# Patient Record
Sex: FEMALE | Race: WHITE | NOT HISPANIC OR LATINO | Employment: FULL TIME | URBAN - METROPOLITAN AREA
[De-identification: names, ages, dates, MRNs, and addresses within clinical notes are randomized per-mention and may not be internally consistent; named-entity substitution may affect disease eponyms.]

---

## 2021-05-29 ENCOUNTER — RECORDS - HEALTHEAST (OUTPATIENT)
Dept: ADMINISTRATIVE | Facility: CLINIC | Age: 36
End: 2021-05-29

## 2024-01-05 ENCOUNTER — TELEPHONE (OUTPATIENT)
Dept: BEHAVIORAL HEALTH | Facility: CLINIC | Age: 39
End: 2024-01-05
Payer: COMMERCIAL

## 2024-01-05 ENCOUNTER — HOSPITAL ENCOUNTER (OUTPATIENT)
Facility: CLINIC | Age: 39
Setting detail: OBSERVATION
Discharge: HOME | End: 2024-01-06
Admitting: NURSE PRACTITIONER
Payer: COMMERCIAL

## 2024-01-05 VITALS
BODY MASS INDEX: 29.79 KG/M2 | HEART RATE: 72 BPM | HEIGHT: 67 IN | DIASTOLIC BLOOD PRESSURE: 80 MMHG | WEIGHT: 189.8 LBS | RESPIRATION RATE: 18 BRPM | SYSTOLIC BLOOD PRESSURE: 117 MMHG | TEMPERATURE: 97.7 F | OXYGEN SATURATION: 98 %

## 2024-01-05 DIAGNOSIS — F32.A ANXIETY AND DEPRESSION: ICD-10-CM

## 2024-01-05 DIAGNOSIS — F41.9 ANXIETY AND DEPRESSION: ICD-10-CM

## 2024-01-05 DIAGNOSIS — F33.1 MODERATE EPISODE OF RECURRENT MAJOR DEPRESSIVE DISORDER (H): ICD-10-CM

## 2024-01-05 DIAGNOSIS — R45.851 SUICIDAL IDEATION: ICD-10-CM

## 2024-01-05 PROBLEM — F32.9 MAJOR DEPRESSIVE DISORDER WITH CURRENT ACTIVE EPISODE: Status: ACTIVE | Noted: 2024-01-05

## 2024-01-05 PROCEDURE — 250N000013 HC RX MED GY IP 250 OP 250 PS 637: Performed by: NURSE PRACTITIONER

## 2024-01-05 PROCEDURE — G0378 HOSPITAL OBSERVATION PER HR: HCPCS

## 2024-01-05 PROCEDURE — 99285 EMERGENCY DEPT VISIT HI MDM: CPT

## 2024-01-05 PROCEDURE — 99223 1ST HOSP IP/OBS HIGH 75: CPT | Performed by: NURSE PRACTITIONER

## 2024-01-05 RX ORDER — GABAPENTIN 100 MG/1
100-200 CAPSULE ORAL 3 TIMES DAILY PRN
Status: DISCONTINUED | OUTPATIENT
Start: 2024-01-05 | End: 2024-01-06 | Stop reason: HOSPADM

## 2024-01-05 RX ORDER — ACETAMINOPHEN 325 MG/1
650 TABLET ORAL EVERY 4 HOURS PRN
Status: DISCONTINUED | OUTPATIENT
Start: 2024-01-05 | End: 2024-01-06 | Stop reason: HOSPADM

## 2024-01-05 RX ORDER — ESCITALOPRAM OXALATE 5 MG/1
5 TABLET ORAL DAILY
Status: DISCONTINUED | OUTPATIENT
Start: 2024-01-05 | End: 2024-01-06 | Stop reason: HOSPADM

## 2024-01-05 RX ORDER — PROPRANOLOL HYDROCHLORIDE 10 MG/1
5 TABLET ORAL
COMMUNITY
Start: 2023-10-26

## 2024-01-05 RX ORDER — PSEUDOEPHEDRINE HCL 30 MG
30 TABLET ORAL
COMMUNITY

## 2024-01-05 RX ORDER — BUPROPION HYDROCHLORIDE 300 MG/1
300 TABLET ORAL DAILY
Status: DISCONTINUED | OUTPATIENT
Start: 2024-01-06 | End: 2024-01-06 | Stop reason: HOSPADM

## 2024-01-05 RX ORDER — ONDANSETRON 4 MG/1
4 TABLET, ORALLY DISINTEGRATING ORAL EVERY 6 HOURS PRN
Status: DISCONTINUED | OUTPATIENT
Start: 2024-01-05 | End: 2024-01-06 | Stop reason: HOSPADM

## 2024-01-05 RX ORDER — TRAZODONE HYDROCHLORIDE 50 MG/1
50 TABLET, FILM COATED ORAL AT BEDTIME
Status: DISCONTINUED | OUTPATIENT
Start: 2024-01-05 | End: 2024-01-06 | Stop reason: HOSPADM

## 2024-01-05 RX ORDER — OLANZAPINE 10 MG/2ML
10 INJECTION, POWDER, FOR SOLUTION INTRAMUSCULAR 2 TIMES DAILY PRN
Status: DISCONTINUED | OUTPATIENT
Start: 2024-01-05 | End: 2024-01-06 | Stop reason: HOSPADM

## 2024-01-05 RX ORDER — OLANZAPINE 5 MG/1
5-10 TABLET, ORALLY DISINTEGRATING ORAL 2 TIMES DAILY PRN
Status: DISCONTINUED | OUTPATIENT
Start: 2024-01-05 | End: 2024-01-06 | Stop reason: HOSPADM

## 2024-01-05 RX ORDER — IBUPROFEN 600 MG/1
600 TABLET, FILM COATED ORAL EVERY 6 HOURS PRN
Status: DISCONTINUED | OUTPATIENT
Start: 2024-01-05 | End: 2024-01-06 | Stop reason: HOSPADM

## 2024-01-05 RX ORDER — IBUPROFEN 400 MG/1
400 TABLET, FILM COATED ORAL
COMMUNITY

## 2024-01-05 RX ORDER — PROPRANOLOL HCL 10 MG
5 TABLET ORAL 2 TIMES DAILY PRN
Status: DISCONTINUED | OUTPATIENT
Start: 2024-01-05 | End: 2024-01-06 | Stop reason: HOSPADM

## 2024-01-05 RX ORDER — BUPROPION HYDROCHLORIDE 300 MG/1
300 TABLET ORAL
COMMUNITY
Start: 2022-11-03

## 2024-01-05 RX ADMIN — TRAZODONE HYDROCHLORIDE 25 MG: 50 TABLET ORAL at 22:32

## 2024-01-05 RX ADMIN — GABAPENTIN 200 MG: 100 CAPSULE ORAL at 21:40

## 2024-01-05 RX ADMIN — ESCITALOPRAM 5 MG: 5 TABLET, FILM COATED ORAL at 19:27

## 2024-01-05 ASSESSMENT — ACTIVITIES OF DAILY LIVING (ADL)
ADLS_ACUITY_SCORE: 35

## 2024-01-05 NOTE — ED TRIAGE NOTES
Increasing suicidal thoughts and plan to sit in a running car to harm herself. History of SI for last 4 years.      Triage Assessment (Adult)       Row Name 01/05/24 1322          Triage Assessment    Airway WDL WDL        Respiratory WDL    Respiratory WDL WDL        Skin Circulation/Temperature WDL    Skin Circulation/Temperature WDL WDL        Cardiac WDL    Cardiac WDL WDL        Peripheral/Neurovascular WDL    Peripheral Neurovascular WDL WDL        Cognitive/Neuro/Behavioral WDL    Cognitive/Neuro/Behavioral WDL X

## 2024-01-05 NOTE — PROGRESS NOTES
Pt is 38 year old female with history of Anxiety, Depression and SI received from ED due to worsening anxiety and suicide ideation. Reports that some of her stressors have been from work as a  and an end to a relationship after 10 years. She endorses SI with thoughts of sitting in the garage with her car on or that she wished someone can hit her with a car so she can die, and denies HI. Pt however agrees to remain safe in the unit. Pt came with Mom and her sister who are her support system. Nursing and risk assessments completed. Assessments reviewed with LMHP and physician. Admission information reviewed with patient. Patient given a tour of [facility] and instructions on using the facility. Questions regarding [facility] addressed. Pt safety search completed.

## 2024-01-05 NOTE — Clinical Note
Joana Kulwinder was seen and treated in our emergency department on 1/5/2024.    She was instructed to return to work when cleared by her outpatient provider.     Sincerely,     [facility] Emergency Dept

## 2024-01-05 NOTE — ED NOTES
[facility]  ED to EMPATH Checklist:      Goal for EMPATH: Depression management and Suicidality    Current Behavior: Cooperative    Safety Concerns: Suicidal, with a plan to    Legal Hold Status: Voluntary    Medically Cleared by ED provider: Yes    Patient Therapeutically Searched: Not searched - Currently in triage    Belongings: Remain with patient    Independent Ambulation at Baseline: Yes/No: Yes    Participates in Care/Conversation: Yes/No: Yes    Patient Informed about EMPATH: Yes/No: Yes    DEC: Not ordered    Patient Ready to be Transferred to EMPATH? Yes/No: Yes

## 2024-01-05 NOTE — ED PROVIDER NOTES
History     Chief Complaint:  Psychiatric Evaluation       HPI   Joana Miramontes is a 38 year old female with a history of anxiety and depression presents emergency department with a complaint of suicidal ideation.  Patient reports that she has been having suicidal ideation for 1 week.  Her thoughts are to sit in her car in the garage with it running, she is also hoping that she gets hit by a car so she can die.  Patient denies any thoughts of wanting to harm anyone else.  She has not ever been admitted before for a mental health problem.  She is currently on Wellbutrin and propranolol.  She has not made any attempts at suicide today.  She denies any self harming.      Independent Historian:   None - Patient Only    Review of External Notes:          Medications:    Wellbutrin  Propranolol    Past Medical History:    No past medical history on file.    Past Surgical History:    No past surgical history on file.     Physical Exam   Patient Vitals for the past 24 hrs:   BP Temp Temp src Pulse Resp SpO2   01/05/24 1321 (!) 145/81 97.3  F (36.3  C) Oral 78 18 99 %        Physical Exam  General: Tearful  Abdomen: Soft, non-tender  Respiratory: No tachypnea  Cardiovascular: Equal pulses, brisk cap refill  Extremities: Moving all extremities      Emergency Department Course       Imaging:  No orders to display          Laboratory:  Labs Ordered and Resulted from Time of ED Arrival to Time of ED Departure - No data to display     Procedures       Emergency Department Course & Assessments:    PSS-3      Date and Time Over the past 2 weeks have you felt down, depressed, or hopeless? Over the past 2 weeks have you had thoughts of killing yourself? Have you ever attempted to kill yourself? When did this last happen? User   01/05/24 1323 yes yes no -- XYQ                Suicide assessment completed by mental health (D.E.C., LCSW, etc.)    Interventions:  Medications - No data to display     Assessments:      Independent  Interpretation (X-rays, CTs, rhythm strip):  None    Consultations/Discussion of Management or Tests:  None        Social Determinants of Health affecting care:   None    Disposition:  The patient was transferred to [facility].     Impression & Plan    CMS Diagnoses: None      Medical Decision Makin-year-old female presents emergency department with a complaint of suicidal ideation.  Patient does have a history of anxiety and depression.  She is currently on Wellbutrin and propranolol.  Patient reports for the past week she has had suicidal ideation.  She has been thinking of plans.  She states that she hopes she gets in a car accident.  She also has been thinking about sitting in her car in the garage and letting it run.  Patient has never had any previous admissions for mental health.  On exam patient is tearful, but calm and cooperative.  She does not have any other complaints today.  [facility]      Diagnosis:    ICD-10-CM    1. Suicidal ideation  R45.851       2. Anxiety and depression  F41.9     F32.A            Discharge Medications:  New Prescriptions    No medications on file          Joana Mcintosh MD  2024   No att. providers found        Joana Mcintosh MD  24 0023

## 2024-01-06 PROCEDURE — G0378 HOSPITAL OBSERVATION PER HR: HCPCS

## 2024-01-06 PROCEDURE — 99239 HOSP IP/OBS DSCHRG MGMT >30: CPT | Performed by: NURSE PRACTITIONER

## 2024-01-06 PROCEDURE — 250N000013 HC RX MED GY IP 250 OP 250 PS 637: Performed by: NURSE PRACTITIONER

## 2024-01-06 RX ORDER — TRAZODONE HYDROCHLORIDE 50 MG/1
25-50 TABLET, FILM COATED ORAL
Qty: 30 TABLET | Refills: 0 | Status: SHIPPED | OUTPATIENT
Start: 2024-01-06

## 2024-01-06 RX ORDER — GABAPENTIN 100 MG/1
100-200 CAPSULE ORAL 3 TIMES DAILY PRN
Qty: 30 CAPSULE | Refills: 0 | Status: SHIPPED | OUTPATIENT
Start: 2024-01-06

## 2024-01-06 RX ORDER — ESCITALOPRAM OXALATE 10 MG/1
5 TABLET ORAL DAILY
Qty: 30 TABLET | Refills: 0 | Status: SHIPPED | OUTPATIENT
Start: 2024-01-06

## 2024-01-06 RX ADMIN — GABAPENTIN 100 MG: 100 CAPSULE ORAL at 11:48

## 2024-01-06 RX ADMIN — ESCITALOPRAM 5 MG: 5 TABLET, FILM COATED ORAL at 08:12

## 2024-01-06 RX ADMIN — BUPROPION HYDROCHLORIDE 300 MG: 300 TABLET, EXTENDED RELEASE ORAL at 08:12

## 2024-01-06 ASSESSMENT — ACTIVITIES OF DAILY LIVING (ADL)
ADLS_ACUITY_SCORE: 35

## 2024-01-06 ASSESSMENT — COLUMBIA-SUICIDE SEVERITY RATING SCALE - C-SSRS
2. HAVE YOU ACTUALLY HAD ANY THOUGHTS OF KILLING YOURSELF?: NO
ATTEMPT SINCE LAST CONTACT: NO
6. HAVE YOU EVER DONE ANYTHING, STARTED TO DO ANYTHING, OR PREPARED TO DO ANYTHING TO END YOUR LIFE?: NO
1. SINCE LAST CONTACT, HAVE YOU WISHED YOU WERE DEAD OR WISHED YOU COULD GO TO SLEEP AND NOT WAKE UP?: NO
TOTAL  NUMBER OF INTERRUPTED ATTEMPTS SINCE LAST CONTACT: NO
SUICIDE, SINCE LAST CONTACT: NO

## 2024-01-06 NOTE — PLAN OF CARE
Joana Moralesmaria e  January 5, 2024  Plan of Care Hand-off Note     Patient Care Path: observation    Plan for Care:   Pt is presenting with elevated symptoms of anxiety and SI due to increasing psychosocial stressors related to her work. She has been nauseated, dry heaving, pacing, sobbing and hyper ventilating. She has is having passive SI with thoughts and carbon monoxide posing or getting hit by a car. She denies ant past higher level of care, she denies past sucide attamepts and current  and VH.   A lower level of care has been unsuccessful in treating and stabilizing patient s mental health symptoms. Patient will remain on [facility] unit under observation for continued monitoring, treatment and therapeutic intervention of mental health symptoms. Observation at [facility] could help mitigate the need for a more restrictive level of care in an inpatient setting.    Identified Goals and Safety Issues: Decrease anxiety and SI.    Overview[facility]  [facility] [phone] [facility] [phone]    Discharge plan:  OP medication management appointment has been made for 1/12/22 at 2 PM and is listed in pt discharge instructions.  Pt may be interested in IOP or PHP programming and will need either program intake date or referrals to IOP programs.           Legal Status: Legal Status at Admission: Voluntary/Patient has signed consent for treatment    Psychiatry Consult: Pt is on [facility].        Updated APRN and RN  regarding plan of care.           Damaris Merchant

## 2024-01-06 NOTE — ED PROVIDER NOTES
"[facility] Unit - Initial Psychiatric Observation Note  [facility] Emergency Department  Observation Initiation Date: Jan 5, 2024    Joana Miramontes MRN: [id number]   Age: 38 year old YOB: 1985     History     Chief Complaint   Patient presents with    Psychiatric Evaluation     HPI  Joana \"Larisa\" SAUL Miramontes is a 38 year old female with a past history notable for a history of depression and anxiety, now with increasing depression and anxiety with suicide ideation in the presence of heightened psychosocial stressors. She was initially seen in the emergency department, was cleared medically and transferred to [facility] for additional assessment and treatment planning. At the time of our visit, she was nearing 7 hours in emergency care.    Please see the [facility] assessment & reassessment (if available), ED physician notes and nursing notes for additional information and collateral content if available. All were reviewed prior to meeting with the patient. Pertinent content includes the following: SI includes a plan for CO2 poisoning or to get hit by a car.    Today, 1/5/2024 Larisa is standing in the milieu. She readily agreed to accompany me to a conference room for an interview. She is engaged throughout the interview and appears to be a reliable informant. She describes worsening anxiety and depression particularly surrounding job stress. She reports that she left a toxic work environment over a year ago, and her current work situation is such that it has also now deteriorated to a point that it is resembling her previous situation. 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 She reports that she cries while she is working. In general she has been crying more and more anxious. She has no difficulty falling asleep but is having nightmares related to work stress and she tosses and turns all night and wakes early. Her appetite seems slightly " "diminished. She is taking Wellbutrin 300 mg daily and propranolol 5 mg twice daily for anxiety. She has tried two previous SSRIs, and found some benefit from fluoxetine prior to switching to bupropion. She reports being mostly adherent with the medication plan of care, but has missed doses occasionally. She also reports being very sensitive to medications and needs to start at lower doses than is typically prescribed. She reports that she cannot take hydroxyzine, even at 12.5 mg as the side effects of fatigue are overwhelming. She reports that at this time, she is no longer having suicide ideation and feels safe in [facility]. There is no homicide ideation, A/V hallucinations, agitation or paranoia. She has an outside therapist who she trusts and she would like to get connected with a psychiatric provider. She agrees to medication changes and to remain overnight on [facility].      Past Medical History  No past medical history on file.  No past surgical history on file.  buPROPion (WELLBUTRIN XL) 300 MG 24 hr tablet  ibuprofen (ADVIL/MOTRIN) 400 MG tablet  propranolol (INDERAL) 10 MG tablet  pseudoePHEDrine (SUDAFED) 30 MG tablet      Allergies   Allergen Reactions    Codeine      Family History  No family history on file.  Social History           Review of Systems  A medically appropriate review of systems was performed with pertinent positives and negatives noted in the HPI, and all other systems negative.    Physical Examination   BP: (!) 145/81  Pulse: 78  Temp: 97.3  F (36.3  C)  Resp: 18  Height: 170.2 cm (5' 7\")  Weight: 86.1 kg (189 lb 12.8 oz)  SpO2: 99 %    Physical Exam  General: Appears stated age.   Neuro: Alert and fully oriented. Extremities appear to demonstrate normal strength on visual inspection.   Integumentary/Skin: no rash visualized, normal color    Psychiatric Examination   Appearance: awake, alert  Attitude:  cooperative  Eye Contact:  good  Mood:  anxious and depressed  Affect:  mood " congruent  Speech:  clear, coherent  Psychomotor Behavior:  no evidence of tardive dyskinesia, dystonia, or tics  Thought Process:  logical and goal oriented  Associations:  no loose associations  Thought Content:  no evidence of suicidal ideation or homicidal ideation and no evidence of psychotic thought  Insight:  fair  Judgement:  intact  Oriented to:  time, person, and place  Attention Span and Concentration:  fair  Recent and Remote Memory:  intact  Language: able to name/identify objects without impairment  Fund of Knowledge: intact with awareness of current and past events    ED Course        Labs Ordered and Resulted from Time of ED Arrival to Time of ED Departure - No data to display    Assessments & Plan (with Medical Decision Making)   Patient presenting with increasing depression with suicide ideation and anxiety in the presence of heightened psychosocial stress. She initially presented with specific suicide ideation, which has dissipated while in [facility]. She is overtly depressed and anxious and would benefit from additional time in [facility] for emotional regulation, repose and reflection. Today, through a shared decision-making process, we developed the treatment plan as is noted below . Nursing notes reviewed noting no acute issues.     I have reviewed the assessment completed by the [facility].     During the observation period, the patient did not require medications for agitation, and did not require restraints/seclusion for patient and/or provider safety.     The patient was found to have a psychiatric condition that would benefit from an observation stay in the emergency department for further psychiatric stabilization and/or coordination of a safe disposition. The observation plan includes serial assessments of psychiatric condition, potential administration of medications if indicated, further disposition pending the patient's psychiatric course during the monitoring period.     Preliminary diagnosis:     ICD-10-CM    1. Suicidal ideation  R45.851       2. Anxiety and depression  F41.9     F32.A       3. Moderate episode of recurrent major depressive disorder (H)  F33.1            Treatment Plan:  - Admit for Observation to [facility].  - Will start escitalopram at 5 mg, with anticipation of titrating this up slowly as an outpatient, to target depression and anxiety.  - Will continue bupropion  mg.  - Will schedule trazodone at bedtime, 25 - 50 mg.  - Will keep propranolol 5mg twice daily as needed for anxiety, and will additionally order gabapentin 100-200 mg three times daily as needed for anxiety.  - [facility] standing orders for pain & agitation.  -Medication education provided this visit includes, rationale for medication, importance of compliance, medication interactions, and common side effects.   -Supportive psychotherapy provided regarding patient's stressors and past mental health symptoms problem solving ways to improve overall wellness and cope with acute and chronic mental health symptoms.  -Observe overnight and reassess tomorrow.  - Anticipate referral for IOP/PHP intake, and time away from work. She will ask her therapist to complete FMLA paperwork. She may need a work note to indicate time off until her outpatient psychiatric visit takes place.      --  NELLA Shah CNP   [facility] EMERGENCY DEPT  [facility] Unit      Deb Gonzales APRN CNP  01/05/24 2000

## 2024-01-06 NOTE — DISCHARGE INSTRUCTIONS
Medication Management   Sushila Cole DNP, CNP,RN    [facility], [facility]   [address], [address]   [phone]  1/12/2024 2:00 pm     IOP Programs in the [facility] area:    [facility]    [phone]          [facility] & [place] location    [phone]          [facility] location    [phone]          [facility] and [place] location    [phone]           [facility] location    [phone]           [facility]  ([facility])   Locations: [facility], [place], [facility].   [phone]           Hari sandoval & Edmund    [facility]    [phone]

## 2024-01-06 NOTE — CONSULTS
Diagnostic Evaluation Consultation  Crisis Assessment    Patient Name: Joana Miramontes  Age:  38 year old  Legal Sex: female  Gender Identity: female  Pronouns:   Race: White  Ethnicity: Not  or   Language: English      Patient was assessed: In person      Patient location: [facility] EMERGENCY DEPT                             [id number]    Referral Data and Chief Complaint  Joana Miramontes presents to the ED with family/friends. Patient is presenting to the ED for the following concerns: Anxiety, Suicidal ideation.   Factors that make the mental health crisis life threatening or complex are:  Pt is presenting to the ED today due to heightened anxiety and SI. She has been dealing with an increasingly stressful work situation since this summer. She works as a . She does not feel supported, She has lost autonomy and does not feel that she is trusted. She was recently told that that she has missed deadlines and that they have perceived her to be too sensitive to give feedback to. Pt took a long break over the holidays and became increasingly anxious about returning to work this week. She has been nauseated, dry heaving, pacing, sobbing and hyper ventilating. She has an episode of increased anxiety this summer. Since then, she has having passive SI with thoughts and carbon monoxide posing or getting hit by a car. She has been sitting in the car after work with thoughts of turning the car back on. She denied past attempts and current prepatory acts. She reported that she has never thought to act on these thoughts as she is scared of death. Currently death feels like the lesser of two between work and death. Pt was prescribed propanol this fall and only took it once then as she didn't like it. The last week she has taken a full dose every day spread out over a few times per day..      Informed Consent and Assessment Methods  Explained the crisis assessment process,  including applicable information disclosures and limits to confidentiality, assessed understanding of the process, and obtained consent to proceed with the assessment.  Assessment methods included conducting a formal interview with patient, review of medical records, collaboration with medical staff, and obtaining relevant collateral information from family and community providers when available.  : done     Patient response to interventions: eager to participate  Coping skills were attempted to reduce the crisis:  Pt shared that she has been using TV for distraction as well as taking THC. She was help seeking in coming to the ED today.     History of the Crisis   Pt's last place of employment was very stressful and toxic for her. She stayed there for 7 year and sh and her family describes this job as traumatic for her. This expereience makes her current job sitiuation more intense and anxiety provoking for her.    Brief Psychosocial History  Family:  Single, Children no  Support System:  Parent(s), Sibling(s)  Employment Status:  employed full-time  Source of Income:  salary/wages  Financial Environmental Concerns:     Current Hobbies:  interaction with pets, family functions  Barriers in Personal Life:  mental health concerns    Significant Clinical History  Current Anxiety Symptoms:  excessive worry, anxious  Current Depression/Trauma:  crying or feels like crying, sadness, hopelessness, thoughts of death/suicide  Current Somatic Symptoms:  sweating, flushing, shaking, excessive worry, anxious  Current Psychosis/Thought Disturbance:     Current Eating Symptoms:  loss of appetite  Chemical Use History:  Alcohol: None  Benzodiazepines: None  Opiates: None  Cocaine: None  Marijuana: None  Last Use:: 12/27/23  Other Use: None   Past diagnosis:  Anxiety Disorder, Depression  Family history:     Past treatment:  Individual therapy, Psychiatric Medication Management  Details of most recent treatment:  Pt has seen her  current therapist weekly fir the last 4 years. No past [facility] treatment.  Other relevant history:          Collateral Information  Is there collateral information: Yes     Collateral information name, relationship, phone number:  Mom: Fely Concepcion ([phone]) and sister: Jennifer Concepcion ([phone]), FADIA on file for both.    What happened today: Pt is in a work situation that has been increasingly stressful for her since this summer, escalating as she was anticipating to return to work after the holiday break. Her work situation is particularly stressful as it reminds her of the trauma she experienced from her last job that was very toxic to her. 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 In the last few days she has not been sleeping well or eating. She has been hyperventilating, dry heaving and crying. Started today she expressed SI for the first day. Pt has a therapist that she has worked with for the last 4 years. They met yesterday and made a safety plan for if symptoms increased.     What is different about patient's functioning: She has been hyperventilating, dry heaving and crying. Started today she expressed SI for the first day. She has been unable to focus at work. She has been pacing.     Concern about alcohol/drug use: No    What do you think the patient needs: She needs medication that will help her and relief from her work.     Has patient made comments about wanting to kill themselves/others: yes    If d/c is recommended, can they take part in safety/aftercare planning:  yes    Additional collateral information:        Risk Assessment  [place] Suicide Severity Rating Scale Full Clinical Version:  Suicidal Ideation  Q1 Wish to be Dead (Lifetime): Yes  Q2 Non-Specific Active Suicidal Thoughts (Lifetime): Yes  3. Active Suicidal Ideation with any Methods (Not Plan) Without Intent to Act (Lifetime): Yes  Q4 Active  Suicidal Ideation with Some Intent to Act, Without Specific Plan (Lifetime): Yes  Q5 Active Suicidal Ideation with Specific Plan and Intent (Lifetime): No  Q6 Suicide Behavior (Lifetime): no     Suicidal Behavior (Lifetime)  Actual Attempt (Lifetime): No  Has subject engaged in non-suicidal self-injurious behavior? (Lifetime): No  Interrupted Attempts (Lifetime): No  Aborted or Self-Interrupted Attempt (Lifetime): No  Preparatory Acts or Behavior (Lifetime): No    [place] Suicide Severity Rating Scale Recent:   Suicidal Ideation (Recent)  Q1 Wished to be Dead (Past Month): yes  Q2 Suicidal Thoughts (Past Month): yes  Q3 Suicidal Thought Method: yes  Q4 Suicidal Intent without Specific Plan: yes  Q5 Suicide Intent with Specific Plan: no  Level of Risk per Screen: high risk  Intensity of Ideation (Recent)  Most Severe Ideation Rating (Past 1 Month): 3  Frequency (Past 1 Month): Many times each day  Duration (Past 1 Month): Fleeting, few seconds or minutes  Controllability (Past 1 Month): Can control thoughts with some difficulty  Deterrents (Past 1 Month): Deterrents definitely stopped you from attempting suicide  Reasons for Ideation (Past 1 Month): Mostly to end or stop the pain (You couldn't go on living with the pain or how you were feeling)  Suicidal Behavior (Recent)  Actual Attempt (Past 3 Months): No  Interrupted Attempts (Past 3 Months): No  Aborted or Self-Interrupted Attempt (Past 3 Months): No  Preparatory Acts or Behavior (Past 3 Months): No    Environmental or Psychosocial Events: work or task failure  Protective Factors: Protective Factors: strong bond to family unit, community support, or employment, responsibilities and duties to others, including pets and children, lives in a responsibly safe and stable environment, good treatment engagement, sense of importance of health and wellness, able to access care without barriers, supportive ongoing medical and mental health care relationships, help  seeking, good impulse control, sense of belonging, reality testing ability    Does the patient have thoughts of harming others? Feels Like Hurting Others: no  Previous Attempt to Hurt Others: no  Is the patient engaging in sexually inappropriate behavior?: no    Is the patient engaging in sexually inappropriate behavior?  no        Mental Status Exam   Affect: Appropriate  Appearance: Appropriate  Attention Span/Concentration: Attentive  Eye Contact: Engaged    Fund of Knowledge: Appropriate   Language /Speech Content: Fluent  Language /Speech Volume: Normal  Language /Speech Rate/Productions: Articulate  Recent Memory: Intact  Remote Memory: Intact  Mood: Sad, Anxious  Orientation to Person: Yes   Orientation to Place: Yes  Orientation to Time of Day: Yes  Orientation to Date: Yes     Situation (Do they understand why they are here?): Yes  Psychomotor Behavior: Normal  Thought Content: Clear  Thought Form: Goal Directed, Intact     Mini-Cog Assessment  Number of Words Recalled:    Clock-Drawing Test:     Three Item Recall:    Mini-Cog Total Score:       Medication  Psychotropic medications:   Medication Orders - Psychiatric (From admission, onward)      Start     Dose/Rate Route Frequency Ordered Stop    01/06/24 0800  buPROPion (WELLBUTRIN XL) 24 hr tablet 300 mg         300 mg Oral DAILY 01/05/24 1851 01/05/24 2200  traZODone (DESYREL) half-tab 25 mg        See Hyperspace for full Linked Orders Report.    25 mg Oral AT BEDTIME 01/05/24 1852 01/05/24 2200  traZODone (DESYREL) tablet 50 mg        See Hyperspace for full Linked Orders Report.    50 mg Oral AT BEDTIME 01/05/24 1852 01/05/24 1855  escitalopram (LEXAPRO) tablet 5 mg         5 mg Oral DAILY 01/05/24 1851 01/05/24 1848  OLANZapine zydis (zyPREXA) ODT tab 5-10 mg        See Hyperspace for full Linked Orders Report.    5-10 mg Oral 2 TIMES DAILY PRN 01/05/24 1849 01/05/24 1848  OLANZapine (zyPREXA) injection 10 mg        See  Hyperspace for full Linked Orders Report.    10 mg Intramuscular 2 TIMES DAILY PRN 01/05/24 0243               Current Care Team  Patient Care Team:  No Ref-Primary, Physician as PCP - General    Diagnosis  Patient Active Problem List   Diagnosis Code    Major depressive disorder with current active episode F32.9    Anxiety F41.9       Primary Problem This Admission  Active Hospital Problems    *Major depressive disorder with current active episode      Anxiety        Clinical Summary and Substantiation of Recommendations   Pt is presenting with elevated symptoms of anxiety and SI due to increasing psychosocial stressors related to her work. She has been nauseated, dry heaving, pacing, sobbing and hyper ventilating. She has is having passive SI with thoughts and carbon monoxide posing or getting hit by a car. She denies ant past higher level of care, she denies past sucide attamepts and current AH and VH.   A lower level of care has been unsuccessful in treating and stabilizing patient s mental health symptoms. Patient will remain on [facility] unit under observation for continued monitoring, treatment and therapeutic intervention of mental health symptoms. Observation at [facility] could help mitigate the need for a more restrictive level of care in an inpatient setting.                          Patient coping skills attempted to reduce the crisis:  Pt shared that she has been using TV for distraction as well as taking THC. She was help seeking in coming to the ED today.    Disposition  Recommended disposition: Individual Therapy, Medication Management        Reviewed case and recommendations with attending provider. Attending Name: Deb Gonzales       Attending concurs with disposition: yes       Patient and/or validated legal guardian concurs with disposition:   yes       Final disposition:  observation    Legal status on admission: Voluntary/Patient has signed consent for treatment    Assessment Details   Total duration  spent with the patient: 40 min     CPT code(s) utilized: 48589 - Psychotherapy for Crisis - 60 (30-74*) min    Damaris Merchant Psychotherapist  [facility] - Triage & Transition Services  Callback: [phone]

## 2024-01-06 NOTE — PROGRESS NOTES
Patient spent most of her time resting on the recliner, She was able to eat dinner this afternoon. Pt reports she is been feeling stressful at work since this summer. She doesn't feel supported. She reports being anxious to day, she was given 200 mg of gabapentin. She still endorses SI, but contracts for safety on the unit.

## 2024-01-06 NOTE — ED NOTES
Pt slept through the night uneventfully. No signs of distress noted. Respirations were even and unlabored.

## 2024-01-06 NOTE — PROGRESS NOTES
Pt VSS. Pt is cooperative and pleasant. Pt reports feeling anxious and worried about her job. Pt shared that when she thinks about a new week starting she gets increasingly anxious as it reminds her of work. Pt has low appetite and attributes her anxiety to her lack of appetite.

## 2024-01-06 NOTE — PROGRESS NOTES
Pt spent majority of the day in recliner. Pt reports that gabapentin was helpful in improving her anxiety. Pt shared that she is feeling better today in comparison to yesterday. Pt cooperative and pleasant.

## 2024-01-07 NOTE — PROGRESS NOTES
"Triage and Transition Services Extended Care Reassessment     Patient: Joana goes by \"Joana,\" uses she/her pronouns  Date of Service: January 6, 2024  Site of Service: [facility] EMERGENCY DEPT                             EMP13  Patient was seen yes  Mode of Assessment: In person     Reason for Reassessment: worsening psychosocial stress, increased anxiety.     History of Patient's Original Emergency Room Encounter: Pt has been dealing with an increasinginly stressful work situation complicated by trauma related to her past work situation. Pt had intense anxiety pshycial symptoms leading up to her ED visit with SI.    Current Patient Presentation: Pt reported reduced anxiety and explained that the medications have been helpful for her. She is stressed about her work situation but is feeling good about the plan that has been made about taking a medical leave.    Presentation Summary: Pt reported reduced anxiety and explained that the medications have been helpful for her. She is stressed about her work situation but is feeling good about the plan that has been made about taking a medical leave. Pt denied current SI.    Changes Observed Since Initial Assessment: decrease in presenting symptoms    Therapeutic Interventions Provided: Engaged in safety planning, Worked on relapse prevention planning (review of stressors, early warning signs, written plan to respond to signs, and rehearse plan)., Identified and practiced coping skills., Explored strategies for self-soothing.    Current Symptoms: excessive worry, anxious sadness, excessive guilt, crying or feels like crying anxious   loss of appetite    Mental Status Exam   Affect: Appropriate  Appearance: Appropriate  Attention Span/Concentration: Attentive  Eye Contact: Engaged    Fund of Knowledge: Appropriate   Language /Speech Content: Fluent  Language /Speech Volume: Normal  Language /Speech Rate/Productions: Articulate  Recent Memory: " Intact  Remote Memory: Intact  Mood: Anxious  Orientation to Person: Yes   Orientation to Place: Yes  Orientation to Time of Day: Yes  Orientation to Date: Yes     Situation (Do they understand why they are here?): Yes  Psychomotor Behavior: Normal  Thought Content: Clear  Thought Form: Goal Directed    Treatment Objective(s) Addressed: identifying an appropriate aftercare plan, safety planning, identifying and practicing coping strategies, assessing safety    Patient Response to Interventions: eager to participate    Progress Towards Goals:  Patient Reports Symptoms Are: improving  Patient Progress Toward Goals: is making progress  Comment: Pt reports reduced anxiety.  Next Step to Work Toward Discharge:  (Pt is discharging. Appointment has been made for psychiatry and she has been provided with referral for IOP.)    Case Management:      C-SSRS Since Last Contact:   1. Wish to be Dead (Since Last Contact): No  2. Non-Specific Active Suicidal Thoughts (Since Last Contact): No     Actual Attempt (Since Last Contact): No  Interrupted Attempts (Since Last Contact): No  Preparatory Acts or Behavior (Since Last Contact): No  Suicide (Since Last Contact): No     Calculated C-SSRS Risk Score (Since Last Contact): No Risk Indicated    Plan: Final Disposition / Recommended Care Path: discharge  Plan for Care reviewed with assigned Medical Provider: yes  Plan for Care Team Review: provider  Comments: Deb Gonzales  Patient and/or validated legal guardian concurs: yes  Clinical Substantiation: Pt is reported decreased anxiety at this point. She is feeling relief after the decision to take a TIFFANY from work and is planning on having her mother help her with this process. She denies SI at this time. A psychiatry appointment has been scheduled for her and she has been provided with a list of agencies that offer IOP programs.   After the period in observation care, the patient's circumstances and mental state were safe for outpatient  management. After therapeutic treatment, intervention and aftercare planning by [facility] care team and consultation with attending provider, the patient was discharged. Close follow-up with a psychiatrist and/or therapist was recommended and community psychiatric resources were provided. Patient is to return to the ED if any urgent or potentially life-threatening concerns arise.       At the time of discharge, the patient's acute suicide risk was determined to be low due to the following factors: reduction in the intensity of mood/anxiety symptoms that preceded the admission, denial of suicidal thoughts, denies feeling helpless or hopeless, not currently under the influence of alcohol or illicit substances, denies experiencing command hallucinations, and no immediate access to firearms. Protective factors include: social support, voluntarily seeking mental health support, displays resiliency , established relationship community mental health provider(s), future focused thinking, displays insight, expresses desire to engage in treatment, sense of obligation to people/pets, and safe/stable housing.    Legal Status: Legal Status at Admission: Voluntary/Patient has signed consent for treatment    Session Status: Time session started: 1427  Time session ended: 1459  Session Duration (minutes): 30 minutes  Anticipated number of sessions or this episode of care: 1    Session Start Time: 1427  Session Stop Time: 1459  CPT codes: 00787 - Psychotherapy (with patient) - 30 (16-37*) min  Time Spent: 30 minutes      CPT code(s) utilized: 16583 - Psychotherapy (with patient) - 30 (16-37*) min    Diagnosis:   Patient Active Problem List   Diagnosis Code    Major depressive disorder with current active episode F32.9    Anxiety F41.9       Primary Problem This Admission: Active Hospital Problems    *Major depressive disorder with current active episode      Ulysses Merchant   Licensed Mental Health Professional (LMHP),  [facility]  [phone]

## 2024-01-07 NOTE — PROGRESS NOTES
Discharge instructions reviewed with patient including follow-up care plan. Educated on medication regimen and advised not to stop prescribed medication without consulting their physician. Reviewed safety plan and outpatient resources. Patient Denies SI. Patient denies plan/intent to act and has contracted for safety and completed safety plan with [facility].  All belongings which were brought into the hospital have been returned to patient. Escorted off the unit at door 4 accompanied by Staff RN.  Discharged to Parents home in stable condition via mum's.

## 2024-01-07 NOTE — ED PROVIDER NOTES
"[facility] Unit - Psychiatric Observation Discharge Summary  [facility] Emergency Department  Discharge Date: 1/6/2024    Joana Miramontes MRN: [id number]   Age: 38 year old YOB: 1985     Brief HPI & Initial ED Course     Chief Complaint   Patient presents with    Psychiatric Evaluation     HPI  Joana Miramontes is a 38 year old female with history notable for notable for a history of depression and anxiety, now with increasing depression and anxiety with suicide ideation in the presence of heightened psychosocial stressors. She was initially seen in the emergency department, was cleared medically and transferred to [facility] for additional assessment and treatment planning. At the time of our visit, she was nearing 30 hours in emergency care.     Please see the [facility] assessment & reassessment (if available), ED physician notes and nursing notes for additional information and collateral content if available. All were reviewed prior to meeting with the patient. Pertinent content includes the following: No acute events.    On reassessment today, Larisa reports that she slept well overnight but felt a little groggy this morning. She took 25 mg of trazodone last night. She also started escitalopram 5 mg yesterday and has not noticed any side effects. She found that 100 mg of gabapentin helped with her anxiety. She denies SI/HI, A/V hallucinations, paranoia or delusions. She endorsed seeing a woman standing on a ashley on television, and she thought to herself \"Jump!\" We discussed how suicide ideation is a symptom of depression and we need to worry when there in intentionality, means and a plan. She denies all of those descriptions. She is still anxious particularly when it comes to thoughts about returning to work. She plans to have her mom help her with completing [place] paperwork, disability, etc. She feels better regulated today and would like to discharge to her parent's home.      Physical Examination   BP: " "117/80  Pulse: 72  Temp: 97.7  F (36.5  C)  Resp: 18  Height: 170.2 cm (5' 7\")  Weight: 86.1 kg (189 lb 12.8 oz)  SpO2: 98 %    Physical Exam  General: Appears stated age.   Neuro: Alert and fully oriented. Extremities appear to demonstrate normal strength on visual inspection.   Integumentary/Skin: no rash visualized, normal color    Psychiatric Examination   Appearance: awake, alert  Attitude:  cooperative  Eye Contact:  good  Mood:  anxious and better  Affect:  mood congruent and intensity is blunted  Speech:  clear, coherent  Psychomotor Behavior:  no evidence of tardive dyskinesia, dystonia, or tics  Thought Process:  logical and goal oriented  Associations:  no loose associations  Thought Content:  no evidence of suicidal ideation or homicidal ideation and no evidence of psychotic thought  Insight:  good  Judgement:  intact  Oriented to:  time, person, and place  Attention Span and Concentration:  fair  Recent and Remote Memory:  intact  Language: able to name/identify objects without impairment  Fund of Knowledge: intact with awareness of current and past events    Results        Labs Ordered and Resulted from Time of ED Arrival to Time of ED Departure - No data to display    Observation Course   The patient was found to have a psychiatric condition that would benefit from an observation stay in the emergency department for further psychiatric stabilization and/or coordination of a safe disposition. The plan upon observation admission included serial assessments of psychiatric condition, potential administration of medications if indicated, further disposition pending the patient's psychiatric course during the monitoring period.     Serial assessments of the patient's psychiatric condition were performed. Nursing notes were reviewed. During the observation period, the patient did not require medications for agitation, and did not require restraints/seclusion for patient and/or provider safety.     After a " period of working with the treatment team on the EmPATH unit, the patient's mental state improved to allow a safe transition to outpatient care. After counseling on the diagnosis, work-up, and treatment plan, the patient was discharged. Close follow-up with a psychiatrist and/or therapist was recommended and community psychiatric resources were provided. Patient is to return to the ED if any urgent or potentially life-threatening concerns.     Discharge Diagnoses:   Final diagnoses:   Suicidal ideation   Anxiety and depression   Moderate episode of recurrent major depressive disorder (H)       Treatment Plan:  - Continue escitalopram 5 mg daily; anticipate this will increase after seeing her community mental health provider.  - Continue buproprion 300 mg daily.  - Gabapentin 100 -200 mg three times daily for anxiety as needed.  - Trazodone 25 - 50 mg as needed at bedtime for insomnia.  - Follow up with appointments as scheduled for psychiatry and therapy.  -Medication education provided this visit includes, rationale for medication, importance of compliance, medication interactions, and common side effects.   -Supportive psychotherapy provided regarding patient's stressors and past mental health symptoms problem solving ways to improve overall wellness and cope with acute and chronic mental health symptoms.  -Discharge home with family support.        At the time of discharge, the patient's acute suicide risk was determined to be low due to the following factors: Reduction in the intensity of mood/anxiety symptoms that preceded the admission, denial of suicidal thoughts, denies feeling helpless or helpless, not currently under the influence of alcohol or illicit substances, denies experiencing command hallucinations, no immediate access to firearms. The patient's acute risk could be higher if noncompliant with their treatment plan, medications, follow-up appointments or using illicit substances or alcohol.  Protective factors include: social supports, stable housing, pets, and a desire to work toward mental health and wellness.    I spent more than 30 minutes on discharge day activities.    --  NELLA Shah CNP  [facility] EMERGENCY DEPT  EmPATH Unit      Deb Gonzales APRN CNP  01/06/24 1905       Deb Gonzales APRN CNP  01/06/24 1905

## 2024-08-10 ENCOUNTER — HOSPITAL ENCOUNTER (EMERGENCY)
Facility: CLINIC | Age: 39
Discharge: HOME | End: 2024-08-10
Attending: EMERGENCY MEDICINE | Admitting: EMERGENCY MEDICINE

## 2024-08-10 VITALS
HEART RATE: 59 BPM | SYSTOLIC BLOOD PRESSURE: 124 MMHG | DIASTOLIC BLOOD PRESSURE: 69 MMHG | TEMPERATURE: 97.5 F | OXYGEN SATURATION: 98 % | RESPIRATION RATE: 16 BRPM
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
## 2024-08-10 DIAGNOSIS — S09.90XA CLOSED HEAD INJURY, INITIAL ENCOUNTER: ICD-10-CM

## 2024-08-10 PROCEDURE — 99283 EMERGENCY DEPT VISIT LOW MDM: CPT

## 2024-08-10 PROCEDURE — 250N000013 HC RX MED GY IP 250 OP 250 PS 637: Performed by: EMERGENCY MEDICINE

## 2024-08-10 RX ORDER — ACETAMINOPHEN 500 MG
1000 TABLET ORAL ONCE
Status: COMPLETED | OUTPATIENT
Start: 2024-08-10 | End: 2024-08-10

## 2024-08-10 RX ADMIN — ACETAMINOPHEN 1000 MG: 500 TABLET, FILM COATED ORAL at 15:33

## 2024-08-10 ASSESSMENT — COLUMBIA-SUICIDE SEVERITY RATING SCALE - C-SSRS
1. IN THE PAST MONTH, HAVE YOU WISHED YOU WERE DEAD OR WISHED YOU COULD GO TO SLEEP AND NOT WAKE UP?: NO
2. HAVE YOU ACTUALLY HAD ANY THOUGHTS OF KILLING YOURSELF IN THE PAST MONTH?: NO
6. HAVE YOU EVER DONE ANYTHING, STARTED TO DO ANYTHING, OR PREPARED TO DO ANYTHING TO END YOUR LIFE?: NO

## 2024-08-10 ASSESSMENT — ACTIVITIES OF DAILY LIVING (ADL): ADLS_ACUITY_SCORE: 35
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
## 2024-09-28 ENCOUNTER — HEALTH MAINTENANCE LETTER (OUTPATIENT)
Age: 39
End: 2024-09-28

## 2025-01-23 ENCOUNTER — HOSPITAL ENCOUNTER (EMERGENCY)
Facility: CLINIC | Age: 40
End: 2025-01-23
Attending: STUDENT IN AN ORGANIZED HEALTH CARE EDUCATION/TRAINING PROGRAM
Payer: COMMERCIAL

## 2025-01-23 VITALS
HEIGHT: 67 IN | TEMPERATURE: 99.2 F | WEIGHT: 183.1 LBS | RESPIRATION RATE: 18 BRPM | OXYGEN SATURATION: 98 % | BODY MASS INDEX: 28.74 KG/M2 | HEART RATE: 85 BPM | SYSTOLIC BLOOD PRESSURE: 116 MMHG | DIASTOLIC BLOOD PRESSURE: 85 MMHG

## 2025-01-23 DIAGNOSIS — R42 DIZZINESS: ICD-10-CM

## 2025-01-23 DIAGNOSIS — Z76.89 ENCOUNTER FOR PSYCHIATRIC ASSESSMENT: ICD-10-CM

## 2025-01-23 DIAGNOSIS — F44.9: Primary | ICD-10-CM

## 2025-01-23 DIAGNOSIS — J06.9 UPPER RESPIRATORY TRACT INFECTION, UNSPECIFIED TYPE: ICD-10-CM

## 2025-01-23 DIAGNOSIS — R05.1 ACUTE COUGH: ICD-10-CM

## 2025-01-23 PROBLEM — Z97.5 IUD (INTRAUTERINE DEVICE) IN PLACE: Status: ACTIVE | Noted: 2017-08-10

## 2025-01-23 PROBLEM — R79.89 LOW VITAMIN D LEVEL: Status: ACTIVE | Noted: 2017-12-22

## 2025-01-23 PROBLEM — F41.1 GAD (GENERALIZED ANXIETY DISORDER): Status: ACTIVE | Noted: 2017-08-02

## 2025-01-23 PROBLEM — F40.10 SOCIAL ANXIETY DISORDER: Status: ACTIVE | Noted: 2017-09-05

## 2025-01-23 LAB
ANION GAP SERPL CALCULATED.3IONS-SCNC: 12 MMOL/L (ref 7–15)
ATRIAL RATE - MUSE: 68 BPM
BASOPHILS # BLD AUTO: 0 10E3/UL (ref 0–0.2)
BASOPHILS NFR BLD AUTO: 1 %
BUN SERPL-MCNC: 13.6 MG/DL (ref 6–20)
CALCIUM SERPL-MCNC: 9.5 MG/DL (ref 8.8–10.4)
CHLORIDE SERPL-SCNC: 98 MMOL/L (ref 98–107)
CREAT SERPL-MCNC: 0.99 MG/DL (ref 0.51–0.95)
DIASTOLIC BLOOD PRESSURE - MUSE: NORMAL MMHG
EGFRCR SERPLBLD CKD-EPI 2021: 75 ML/MIN/1.73M2
EOSINOPHIL # BLD AUTO: 0.1 10E3/UL (ref 0–0.7)
EOSINOPHIL NFR BLD AUTO: 1 %
ERYTHROCYTE [DISTWIDTH] IN BLOOD BY AUTOMATED COUNT: 12.1 % (ref 10–15)
FLUAV RNA SPEC QL NAA+PROBE: NEGATIVE
FLUBV RNA RESP QL NAA+PROBE: NEGATIVE
GLUCOSE SERPL-MCNC: 158 MG/DL (ref 70–99)
HCO3 SERPL-SCNC: 27 MMOL/L (ref 22–29)
HCT VFR BLD AUTO: 42.5 % (ref 35–47)
HGB BLD-MCNC: 14.5 G/DL (ref 11.7–15.7)
IMM GRANULOCYTES # BLD: 0 10E3/UL
IMM GRANULOCYTES NFR BLD: 0 %
INTERPRETATION ECG - MUSE: NORMAL
LYMPHOCYTES # BLD AUTO: 1.1 10E3/UL (ref 0.8–5.3)
LYMPHOCYTES NFR BLD AUTO: 14 %
MCH RBC QN AUTO: 28.8 PG (ref 26.5–33)
MCHC RBC AUTO-ENTMCNC: 34.1 G/DL (ref 31.5–36.5)
MCV RBC AUTO: 84 FL (ref 78–100)
MONOCYTES # BLD AUTO: 0.4 10E3/UL (ref 0–1.3)
MONOCYTES NFR BLD AUTO: 5 %
NEUTROPHILS # BLD AUTO: 6.2 10E3/UL (ref 1.6–8.3)
NEUTROPHILS NFR BLD AUTO: 78 %
NRBC # BLD AUTO: 0 10E3/UL
NRBC BLD AUTO-RTO: 0 /100
P AXIS - MUSE: 66 DEGREES
PLATELET # BLD AUTO: 262 10E3/UL (ref 150–450)
POTASSIUM SERPL-SCNC: 4.4 MMOL/L (ref 3.4–5.3)
PR INTERVAL - MUSE: 118 MS
QRS DURATION - MUSE: 90 MS
QT - MUSE: 408 MS
QTC - MUSE: 433 MS
R AXIS - MUSE: 27 DEGREES
RBC # BLD AUTO: 5.04 10E6/UL (ref 3.8–5.2)
RSV RNA SPEC NAA+PROBE: NEGATIVE
SARS-COV-2 RNA RESP QL NAA+PROBE: NEGATIVE
SODIUM SERPL-SCNC: 137 MMOL/L (ref 135–145)
SYSTOLIC BLOOD PRESSURE - MUSE: NORMAL MMHG
T AXIS - MUSE: 11 DEGREES
VENTRICULAR RATE- MUSE: 68 BPM
WBC # BLD AUTO: 7.9 10E3/UL (ref 4–11)

## 2025-01-23 PROCEDURE — 87637 SARSCOV2&INF A&B&RSV AMP PRB: CPT | Performed by: STUDENT IN AN ORGANIZED HEALTH CARE EDUCATION/TRAINING PROGRAM

## 2025-01-23 PROCEDURE — 85004 AUTOMATED DIFF WBC COUNT: CPT | Performed by: STUDENT IN AN ORGANIZED HEALTH CARE EDUCATION/TRAINING PROGRAM

## 2025-01-23 PROCEDURE — 80048 BASIC METABOLIC PNL TOTAL CA: CPT | Performed by: STUDENT IN AN ORGANIZED HEALTH CARE EDUCATION/TRAINING PROGRAM

## 2025-01-23 PROCEDURE — 82565 ASSAY OF CREATININE: CPT | Performed by: STUDENT IN AN ORGANIZED HEALTH CARE EDUCATION/TRAINING PROGRAM

## 2025-01-23 PROCEDURE — 36415 COLL VENOUS BLD VENIPUNCTURE: CPT | Performed by: STUDENT IN AN ORGANIZED HEALTH CARE EDUCATION/TRAINING PROGRAM

## 2025-01-23 PROCEDURE — 82310 ASSAY OF CALCIUM: CPT | Performed by: STUDENT IN AN ORGANIZED HEALTH CARE EDUCATION/TRAINING PROGRAM

## 2025-01-23 PROCEDURE — 99284 EMERGENCY DEPT VISIT MOD MDM: CPT

## 2025-01-23 PROCEDURE — 93005 ELECTROCARDIOGRAM TRACING: CPT

## 2025-01-23 PROCEDURE — 85041 AUTOMATED RBC COUNT: CPT | Performed by: STUDENT IN AN ORGANIZED HEALTH CARE EDUCATION/TRAINING PROGRAM

## 2025-01-23 PROCEDURE — 85018 HEMOGLOBIN: CPT | Performed by: STUDENT IN AN ORGANIZED HEALTH CARE EDUCATION/TRAINING PROGRAM

## 2025-01-23 RX ORDER — PRAZOSIN HYDROCHLORIDE 1 MG/1
1 CAPSULE ORAL AT BEDTIME
Status: DISCONTINUED | OUTPATIENT
Start: 2025-01-23 | End: 2025-01-24 | Stop reason: HOSPADM

## 2025-01-23 RX ORDER — VITAMIN B COMPLEX
4 TABLET ORAL DAILY
COMMUNITY

## 2025-01-23 RX ORDER — ONDANSETRON 4 MG/1
4 TABLET, ORALLY DISINTEGRATING ORAL EVERY 6 HOURS PRN
Status: DISCONTINUED | OUTPATIENT
Start: 2025-01-23 | End: 2025-01-24 | Stop reason: HOSPADM

## 2025-01-23 RX ORDER — OLANZAPINE 10 MG/2ML
10 INJECTION, POWDER, FOR SOLUTION INTRAMUSCULAR 2 TIMES DAILY PRN
Status: DISCONTINUED | OUTPATIENT
Start: 2025-01-23 | End: 2025-01-24 | Stop reason: HOSPADM

## 2025-01-23 RX ORDER — ACETAMINOPHEN 325 MG/1
650 TABLET ORAL EVERY 4 HOURS PRN
Status: DISCONTINUED | OUTPATIENT
Start: 2025-01-23 | End: 2025-01-24 | Stop reason: HOSPADM

## 2025-01-23 RX ORDER — DEXTROAMPHETAMINE SACCHARATE, AMPHETAMINE ASPARTATE, DEXTROAMPHETAMINE SULFATE AND AMPHETAMINE SULFATE 1.25; 1.25; 1.25; 1.25 MG/1; MG/1; MG/1; MG/1
5 TABLET ORAL DAILY
COMMUNITY
Start: 2025-01-14

## 2025-01-23 RX ORDER — HYDROXYZINE HYDROCHLORIDE 50 MG/1
50 TABLET, FILM COATED ORAL EVERY 6 HOURS PRN
Status: DISCONTINUED | OUTPATIENT
Start: 2025-01-23 | End: 2025-01-24 | Stop reason: HOSPADM

## 2025-01-23 RX ORDER — OLANZAPINE 10 MG/1
10 TABLET, ORALLY DISINTEGRATING ORAL 2 TIMES DAILY PRN
Status: DISCONTINUED | OUTPATIENT
Start: 2025-01-23 | End: 2025-01-24 | Stop reason: HOSPADM

## 2025-01-23 RX ORDER — TRAZODONE HYDROCHLORIDE 50 MG/1
50 TABLET, FILM COATED ORAL
Status: DISCONTINUED | OUTPATIENT
Start: 2025-01-23 | End: 2025-01-24 | Stop reason: HOSPADM

## 2025-01-23 RX ORDER — PRAZOSIN HYDROCHLORIDE 1 MG/1
1 CAPSULE ORAL AT BEDTIME
COMMUNITY
Start: 2025-01-16

## 2025-01-23 ASSESSMENT — COLUMBIA-SUICIDE SEVERITY RATING SCALE - C-SSRS
6. HAVE YOU EVER DONE ANYTHING, STARTED TO DO ANYTHING, OR PREPARED TO DO ANYTHING TO END YOUR LIFE?: NO
1. IN THE PAST MONTH, HAVE YOU WISHED YOU WERE DEAD OR WISHED YOU COULD GO TO SLEEP AND NOT WAKE UP?: NO
2. HAVE YOU ACTUALLY HAD ANY THOUGHTS OF KILLING YOURSELF IN THE PAST MONTH?: NO

## 2025-01-23 ASSESSMENT — ACTIVITIES OF DAILY LIVING (ADL)
ADLS_ACUITY_SCORE: 41

## 2025-01-24 VITALS
WEIGHT: 183.1 LBS | BODY MASS INDEX: 28.74 KG/M2 | SYSTOLIC BLOOD PRESSURE: 117 MMHG | HEART RATE: 64 BPM | TEMPERATURE: 98.6 F | RESPIRATION RATE: 18 BRPM | OXYGEN SATURATION: 99 % | DIASTOLIC BLOOD PRESSURE: 77 MMHG | HEIGHT: 67 IN

## 2025-01-24 PROBLEM — F43.29 ADJUSTMENT DISORDER WITH MIXED EMOTIONAL FEATURES: Status: ACTIVE | Noted: 2025-01-24

## 2025-01-24 LAB
AMPHETAMINES UR QL SCN: NORMAL
ATRIAL RATE - MUSE: 68 BPM
BARBITURATES UR QL SCN: NORMAL
BENZODIAZ UR QL SCN: NORMAL
BZE UR QL SCN: NORMAL
CANNABINOIDS UR QL SCN: NORMAL
DIASTOLIC BLOOD PRESSURE - MUSE: NORMAL MMHG
FENTANYL UR QL: NORMAL
HCG UR QL: NEGATIVE
INTERPRETATION ECG - MUSE: NORMAL
OPIATES UR QL SCN: NORMAL
P AXIS - MUSE: 66 DEGREES
PCP QUAL URINE (ROCHE): NORMAL
PR INTERVAL - MUSE: 118 MS
QRS DURATION - MUSE: 90 MS
QT - MUSE: 408 MS
QTC - MUSE: 433 MS
R AXIS - MUSE: 27 DEGREES
SYSTOLIC BLOOD PRESSURE - MUSE: NORMAL MMHG
T AXIS - MUSE: 11 DEGREES
VENTRICULAR RATE- MUSE: 68 BPM

## 2025-01-24 PROCEDURE — 250N000013 HC RX MED GY IP 250 OP 250 PS 637: Performed by: STUDENT IN AN ORGANIZED HEALTH CARE EDUCATION/TRAINING PROGRAM

## 2025-01-24 PROCEDURE — 250N000013 HC RX MED GY IP 250 OP 250 PS 637: Performed by: PSYCHIATRY & NEUROLOGY

## 2025-01-24 PROCEDURE — 81025 URINE PREGNANCY TEST: CPT | Performed by: STUDENT IN AN ORGANIZED HEALTH CARE EDUCATION/TRAINING PROGRAM

## 2025-01-24 PROCEDURE — 80307 DRUG TEST PRSMV CHEM ANLYZR: CPT | Performed by: STUDENT IN AN ORGANIZED HEALTH CARE EDUCATION/TRAINING PROGRAM

## 2025-01-24 RX ORDER — GABAPENTIN 100 MG/1
100-200 CAPSULE ORAL 3 TIMES DAILY PRN
Status: DISCONTINUED | OUTPATIENT
Start: 2025-01-24 | End: 2025-01-24 | Stop reason: HOSPADM

## 2025-01-24 RX ORDER — IBUPROFEN 400 MG/1
400 TABLET, FILM COATED ORAL EVERY 6 HOURS PRN
Status: DISCONTINUED | OUTPATIENT
Start: 2025-01-24 | End: 2025-01-24 | Stop reason: HOSPADM

## 2025-01-24 RX ORDER — BUPROPION HYDROCHLORIDE 300 MG/1
300 TABLET ORAL DAILY
Status: DISCONTINUED | OUTPATIENT
Start: 2025-01-24 | End: 2025-01-24 | Stop reason: HOSPADM

## 2025-01-24 RX ADMIN — TRAZODONE HYDROCHLORIDE 50 MG: 50 TABLET ORAL at 00:36

## 2025-01-24 RX ADMIN — GABAPENTIN 200 MG: 100 CAPSULE ORAL at 10:47

## 2025-01-24 RX ADMIN — BUPROPION HYDROCHLORIDE 300 MG: 300 TABLET, EXTENDED RELEASE ORAL at 10:48

## 2025-01-24 RX ADMIN — ESCITALOPRAM OXALATE 15 MG: 5 TABLET, FILM COATED ORAL at 10:48

## 2025-01-24 ASSESSMENT — ACTIVITIES OF DAILY LIVING (ADL)
ADLS_ACUITY_SCORE: 41

## 2025-01-24 ASSESSMENT — COLUMBIA-SUICIDE SEVERITY RATING SCALE - C-SSRS
1. SINCE LAST CONTACT, HAVE YOU WISHED YOU WERE DEAD OR WISHED YOU COULD GO TO SLEEP AND NOT WAKE UP?: NO
ATTEMPT SINCE LAST CONTACT: NO
SUICIDE, SINCE LAST CONTACT: NO
2. HAVE YOU ACTUALLY HAD ANY THOUGHTS OF KILLING YOURSELF?: NO
TOTAL  NUMBER OF INTERRUPTED ATTEMPTS SINCE LAST CONTACT: NO
TOTAL  NUMBER OF ABORTED OR SELF INTERRUPTED ATTEMPTS SINCE LAST CONTACT: NO
6. HAVE YOU EVER DONE ANYTHING, STARTED TO DO ANYTHING, OR PREPARED TO DO ANYTHING TO END YOUR LIFE?: NO

## 2025-01-24 NOTE — ED TRIAGE NOTES
"Patient arrives with mother with concerns for feeling \"disconnected from her body\". Reports her head feels like a balloon that's going to float away or a cement block that's going to fall off. She reports it started a few hours ago. Has been sick for about 10 days. Has never had this before. Denies SI/SIB.      Triage Assessment (Adult)       Row Name 01/23/25 1926          Triage Assessment    Airway WDL WDL        Respiratory WDL    Respiratory WDL WDL        Skin Circulation/Temperature WDL    Skin Circulation/Temperature WDL WDL        Cardiac WDL    Cardiac WDL WDL        Peripheral/Neurovascular WDL    Peripheral Neurovascular WDL WDL        Cognitive/Neuro/Behavioral WDL    Cognitive/Neuro/Behavioral WDL WDL                     "

## 2025-01-24 NOTE — PLAN OF CARE
"Joana Miramontes  January 24, 2025  Plan of Care Hand-off Note     Patient Recommended Care Path: observation    Clinical Substantiation:  Pt presents to the ED accompanied by her mother for medical concerns, several somatic complaints, and dissociative sensations. Pt describes her current mental state as \"feeling zoned out, not belonging to my body, and melting into my chair.\" Pt notes several recent adjustments including a new job, moving out of her parents' house, feeling ill for the past two weeks, and recent increase in Lexapro dosage. Pt does have baseline of anxiety and depression but notes no worsening symptoms. Pt denies SI, HI, AH/VH, TORI, and SIB. Further assessment is needed to determine etiology of dissociative symptoms. A lower level of care has been unsuccessful in treating and stabilizing patient s mental health symptoms. Patient will remain on EmPATH unit under observation for continued monitoring, treatment and therapeutic intervention of mental health symptoms. [facility] mitigate the need for a more restrictive level of care in an inpatient setting.    Goals for crisis stabilization:  Observation of symptoms, stabilization    Next steps for Care Team:  Receive psychiatric consult and reassessment from [facility]    Treatment Objectives Addressed:  assessing safety, processing feelings    Therapeutic Interventions:  Engaged in guided discovery, explored patient's perspectives and helped expand them through socratic dialogue., Provided positive reinforcement for progress towards goals, gains in knowledge, and application of skills previously taught.    Has a specific means been identified for suicidal.homicide actions: No  If yes, describe:    Explain action steps toward mitigation:    Document completion of mitigation action:    The follow up action still needed prior to discharge:      Patient coping skills attempted to reduce the crisis:  Pt advocated for her needs to her " mother by asking to be evaluated at [facility].                          Collateral contact information:  Mom: Fely Concepcion ([phone])    Legal Status: Voluntary/Patient has signed consent for treatment                                                                                                                                 Reviewed court records: yes     Psychiatry Consult:     ARIANNA Salazar
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
## 2025-01-24 NOTE — PROGRESS NOTES
"  Triage and Transition Services Extended Care Reassessment     Patient: Joana goes by \"Joana,\" uses she/her pronouns  Date of Service: January 24, 2025  Site of Service: [facility] Emergency Dept                             [facility]  Patient was seen yes  Mode of Assessment: In person     Reason for Reassessment: other (see comment) Decrease in sx.     History of Patient's Original Emergency Room Encounter: Joana carries previous diagnoses of MDD and JENNIFER. Pt was last seen at [facility] in January of 2024 for concerns of anxiety, depression, and passive SI. Pt denies hx of suicide attempts or past hospitalizations. Pt has current outpatient supports of therapy and psychiatry. Pt increased her daily dose of Lexapro from 10 to 15mg today. Pt and her mother state that pt has no hx of disassociative symptoms.    Presentation Summary: Pt presented with a somewhat anxious and tired affect. Pts mood was congruent with this presentation. Pt was oriented x4. Pt endorsed some mood improvement/decrease in anxiety sx. Pt endorsed feeling like she \"can feel\" her body more and that feels like she is \"more in reality.\" Pt did not endorse any SI/SIB/HI or AH/VH. Pt endorsed having a good rapport with her outpatient therapist and psychiatrist. Pt appears motivated to engage in outpatient supports. Writer discussed PHP/ programmatic care and Pt endorsed that IOP was helpful in the past but was wanting to self refer today and speak with her outpatient providers before referral is placed.    Changes Observed Since Initial Assessment: decrease in presenting symptoms    Therapeutic Interventions Provided: Engaged in safety planning, Engaged in cognitive restructuring/ reframing, looked at common cognitive distortions and challenged negative thoughts., Engaged in guided discovery, explored patient's perspectives and helped expand them through socratic dialogue., Reviewed healthy living that supports positive mental " health, including looking at sleep hygiene, regular movement, nutrition, and regular socialization., Identified and practiced coping skills.    Current Symptoms: anxious sadness anxious, somatic symptoms (abdominal pain, headache, tension)        Mental Status Exam   Affect: Appropriate  Appearance: Appropriate  Attention Span/Concentration: Attentive  Eye Contact: Engaged    Fund of Knowledge: Appropriate   Language /Speech Content: Fluent  Language /Speech Volume: Normal  Language /Speech Rate/Productions: Normal  Recent Memory: Intact  Remote Memory: Intact  Mood: Anxious  Orientation to Person: Yes   Orientation to Place: Yes  Orientation to Time of Day: Yes  Orientation to Date: Yes     Situation (Do they understand why they are here?): Yes  Psychomotor Behavior: Normal  Thought Content: Clear  Thought Form: Intact    Treatment Objective(s) Addressed: rapport building, identifying and practicing coping strategies, safety planning, assessing safety, identifying treatment goals, processing feelings, building distress tolerance    Patient Response to Interventions: acceptance expressed, verbalizes understanding    Progress Towards Goals:  Patient Reports Symptoms Are: improving  Patient Progress Toward Goals: is making progress  Comment: Pt has been receptive to ED interventions.  Next Step to Work Toward Discharge: symptom stabilization  Symptom Stabilization Comment: Pt endorsed mood improvement and reduced somatic sx.    Case Management: Summary of Interaction: None at time of assessment    C-SSRS Since Last Contact:   1. Wish to be Dead (Since Last Contact): No  2. Non-Specific Active Suicidal Thoughts (Since Last Contact): No     Actual Attempt (Since Last Contact): No  Has subject engaged in non-suicidal self-injurious behavior? (Since Last Contact): No  Interrupted Attempts (Since Last Contact): No  Aborted or Self-Interrupted Attempt (Since Last Contact): No  Preparatory Acts or Behavior (Since Last  Contact): No  Suicide (Since Last Contact): No     Calculated C-SSRS Risk Score (Since Last Contact): No Risk Indicated    Plan: Final Disposition / Recommended Care Path: discharge  Plan for Care reviewed with assigned Medical Provider: yes  Plan for Care Team Review: provider, RN  Patient and/or validated legal guardian concurs: yes  Clinical Substantiation: At this time IP MH admission is not being recommended due to Pt not endorsing any SI/SIB/HI or AH/VH. Pt was able to fully safety plan with writer.  During current assessment Pt does not present with any modifiable risk factors that are likely to be mitigated in a hospital setting. Further, current sx and presentation are unlikely to be changed or alleviated by medication management or IP MH therapeutic interventions during a brief hospital stay. Pt does appear to be at higher risk of death by suicide accidental or intentional due to mental health hx.  At this time IP MH admission does not appear to be the most therapeutically beneficial intervention/ least restrictive level of care for Pt. Pt appears to be able to use and motivated to engage in supportive mental health/ community resources. Pt endorsed willingness to follow up with established outpatient providers.    Legal Status: Legal Status: Voluntary/Patient has signed consent for treatment    Session Status: Time session started: 0920  Time session ended: 0936  Session Duration (minutes): 16 minutes  Session Number: 1  Anticipated number of sessions or this episode of care: 1    Session Start Time: 0920  Session Stop Time: 0936  CPT codes: 13586 - Psychotherapy (with patient) - 30 (16-37*) min  Time Spent: 16 minutes      CPT code(s) utilized: 80989 - Psychotherapy (with patient) - 30 (16-37*) min    Diagnosis:   Patient Active Problem List   Diagnosis Code    Major depressive disorder with current active episode F32.9    Anxiety F41.9    Allergic rhinitis J30.9    Asthma J45.909    JENNIFER (generalized  anxiety disorder) F41.1    IUD (intrauterine device) in place Z97.5    Low vitamin D level R79.89    Social anxiety disorder F40.10    Adjustment disorder with mixed emotional features F43.29       Primary Problem This Admission: Active Hospital Problems    *Adjustment disorder with mixed emotional features        Tete Denise, [facility]   Licensed Mental Health Professional (LMHP), [facility]  [phone]

## 2025-01-24 NOTE — ED NOTES
Pt is calm and cooperative,denies any SI/HI, reports to have slept well last night. Patient ate her breakfast and is awaiting that her morning medications be ordered, VSS and met with the LMHP with suggestions of possible discharge.

## 2025-01-24 NOTE — ED PROVIDER NOTES
"  Emergency Department Note      History of Present Illness     Chief Complaint   Altered Mental Status      HPI   Joana Miramontes is a pleasant 39 year old female with history of major depressive disorder and anxiety presenting with mother for a psych evaluation. The mother reports that she drove the patient to the ED today due to symptoms of dissociation and dizziness discussed over the phone, noting the patient stating that she feels as though her \"arms are not connected to her body.\" She adds that the patient initially asked to be driven to Urgent Care but then requested to be brought to [facility] for her symptoms, mentioning that she was previously here for a \"breakdown.\" Mother notes that after her initial [facility] visit she was referred to outpatient treatment with a regular psychiatrist who recently identified a low potassium level after a blood sample from the patient. The mother states that the patient has been sick since 1/13/25 but does not have COVID-19 or Influenza A/B, which has impeded her ability to receive treatment for the low potassium levels.     The patient states that she feels as though she is \"not here,\" \"disconnecting,\" and that the \"floor is moving.\" She states that initially her symptoms started as feeling sick with a sore throat, fever, and body aches that eventually evolved into the \"crud\" accompanied by sinus congestion and a dry cough. She notes that since 1/21/25 her \"equilibrium has felt off.\" Patient further describes her symptoms as feeling like her \"head wants to snap off her neck and roll or float away.\" She endorses a cough, depression, anxiety, and shortness of breath with panic attacks. She denies any fever, nausea, vomiting, diarrhea, suicidal ideation, or homicidal ideation.     Independent Historian   Mother as detailed above.    Review of External Notes   {Notes reviewed:640383::\"None.\"}    I personally reviewed the patient's chart, including available medication list and " "available past medical history, past surgical history, family history, and social history.    Physical Exam     Patient Vitals for the past 24 hrs:   BP Temp Pulse Resp SpO2 Height Weight   01/23/25 1947 134/88 -- 61 (!) 5 98 % 1.702 m (5' 7\") --   01/23/25 1855 (!) 136/91 98.4  F (36.9  C) 61 14 97 % 1.702 m (5' 7\") 81.6 kg (180 lb)     Physical Exam  {List of Robinson Exams:651729::\" \"}    Diagnostics     Lab Results   Labs Ordered and Resulted from Time of ED Arrival to Time of ED Departure   CBC WITH PLATELETS AND DIFFERENTIAL       Result Value    WBC Count 7.9      RBC Count 5.04      Hemoglobin 14.5      Hematocrit 42.5      MCV 84      MCH 28.8      MCHC 34.1      RDW 12.1      Platelet Count 262      % Neutrophils 78      % Lymphocytes 14      % Monocytes 5      % Eosinophils 1      % Basophils 1      % Immature Granulocytes 0      NRBCs per 100 WBC 0      Absolute Neutrophils 6.2      Absolute Lymphocytes 1.1      Absolute Monocytes 0.4      Absolute Eosinophils 0.1      Absolute Basophils 0.0      Absolute Immature Granulocytes 0.0      Absolute NRBCs 0.0     BASIC METABOLIC PANEL   INFLUENZA A/B, RSV AND SARS-COV2 PCR       Imaging   No orders to display       EKG   ECG taken at 2001, ECG read at 2022  Normal sinus rhythm  Normal ECG   Rate 68 bpm. VA interval 118 ms. QRS duration 90 ms. QT/QTc 408/433 ms. P-R-T axes 66 27 11.     Independent Interpretation - See ED Course Below    ED Course      Medications Administered   Medications - No data to display    Procedures   Procedures   None performed    Discussion of Management - See ED Course Below    ED Course   Independent Interpretation / Discussion of Management / Repeat Assessments  ED Course as of 01/23/25 2024   Thu Jan 23, 2025   1905 I obtained history and examined the patient as noted above         Additional Documentation  None    Medical Decision Making / Diagnosis     CMS Diagnoses: None    MIPS       None    MDM   {MDM:233536}    {Decision " "Rule Calculators:625623}  {SDCCHECKLIST:458461}    {Critical care:835480::\" \"}    Disposition   The patient was transferred to [facility]     Diagnosis   No diagnosis found.     Discharge Medications   New Prescriptions    No medications on file       Scribe Disclosure:  I, Martinez Gaytan, am serving as a scribe at 7:13 PM on 1/23/2025 to document services personally performed by Petey Peña MD based on my observations and the provider's statements to me.    " along with dissociation and dizziness.  Vital signs are reassuring.  Suspect patient's dissociation is likely related to her psychiatric symptoms.  With patient's cough and report of electrolyte abnormalities, I did obtain labs to evaluate patient's electrolytes, for electrolyte derangement, anemia, viral illness or pneumonia.  These were reassuring.  Do feel patient is appropriate for transfer to empath unit for further mental health evaluation and therapies as indicated.    Disposition   The patient was transferred to [facility].     Diagnosis     ICD-10-CM    1. Encounter for psychiatric assessment  Z76.89       2. Upper respiratory tract infection, unspecified type  J06.9       3. Acute cough  R05.1       4. Dizziness  R42            Discharge Medications   New Prescriptions    No medications on file       Scribe Disclosure:  I, Martinez Gaytan, am serving as a scribe at 7:13 PM on 1/23/2025 to document services personally performed by Petey Peña MD based on my observations and the provider's statements to me.       Petey Peña MD  01/24/25 021

## 2025-01-24 NOTE — ED NOTES
39 year old female with history of major depressive disorder and anxiety received from ED triage for symptoms of dizziness and dissociation. Patient reports feeling dizzy. She also reports feeling like she is out of her body. Patient reports she has been feeling detached from her body a couple of hours before arriving to the hospital. She also reports that her Lexapro dose was just increased to 15 mg daily. She also endorses having these weird visions where everything seems elongated and stretched. Patient described his zoning out.  Patient expressed concerns about her current situation and is really hoping to get some answers as to what is happening. She denies any recent stressors. Patient currently denies any suicidal ideation or homicidal ideation. UDS is pending collection. Nursing and risk assessments completed. Assessments reviewed with LMHP and physician. Video monitoring in progress, patient informed.  Admission information reviewed with patient. Patient given a tour of [facility] and instructions on using the facility. Questions regarding [facility] addressed. Pt search completed and belongings inventoried.

## 2025-01-24 NOTE — PSYCH
Staff called to request general empath orders for this patient who presented to ED with recent episodes of dissociation. Orders have been placed.

## 2025-01-24 NOTE — ED NOTES
[facility]  ED to EMPATH Checklist:      Goal for EMPATH: Time to stabilize    Current Behavior: Calm and Cooperative    Safety Concerns: None    Legal Hold Status: Voluntary    Medically Cleared by ED provider: Yes    Patient Therapeutically Searched: Not searched - Currently in triage    Belongings: Remain with patient    Independent Ambulation at Baseline: Yes/No: Yes    Participates in Care/Conversation: Yes/No: Yes    Patient Informed about EMPATH: Yes/No: Yes    DEC: Not ordered    Patient Ready to be Transferred to EMP[facility]? Yes/No: Yes
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
## 2025-01-24 NOTE — PROGRESS NOTES
Patient agreed to discharge plan. Discharge instructions reviewed with patient including follow-up care plan. Medications: did not change but the provider educated her on avoiding the cough medication combination with Wellbutrin which could be the potential reason why she is feeling the way she feels. Reviewed safety plan and outpatient resources. Denies SI and HI. All belongings that were brought into the hospital have been returned to patient. Escorted off the unit at door six accompanied by [facility] staff. Discharged to home  via mom's car.

## 2025-01-24 NOTE — DISCHARGE INSTRUCTIONS
Mental health recommendations    Stop dextromethorphan   Please follow-up with your outpatient team about your visit today.    2.  One of our care coordinators will reach out to you after your discharge.  If you have any questions about additional resources please call our coordinators at # [phone]. If you would like to schedule an appointment for therapy or psychiatry  please call our Behavioral Access Scheduling office at [phone].      3.  Please use aftercare plan and already established coping skills and safety planning as needed.     4.  Please call 911 and/or return to the emergency department if her symptoms worsen or your safety becomes compromised. [facility] Adult crisis line, [phone]          [facility] Programmatic Care    Phone: [phone]  Email: [email or web address]  Fax: [phone]    The following is a list of our programming options that may fit your next steps:     Programs  Mental Health  Intensive Outpatient Program (IOP)  Partial Hospitalization Program (PHP)  Day Treatment  Substance Use Disorder  Intensive Outpatient Program  [facility] Outpatient Program  Mental Health & Substance Use Disorder  Dual Diagnosis Intensive Outpatient Program  Co-Occurring Intensive Outpatient Program  Residential or Lodging Plus  Available Locations  [facility], [facility], [place], [place], [place]  Note: Specific program options vary by location.     Transition Clinic  After leaving Emergency Services, it may take up to 30 days to enter a program. Knowing support is important during this period of time, we offer an urgent model of mental health care via the Transition Clinic. We encourage you to discuss this with your Navigator during your telephone appointment.     FAQ  What can I expect after leaving Emergency Services?  If not already, you will soon be contacted by a Navigator who will work with you to find a program that  fits your needs. If you desire to enter one of our [facility] programs, you will enter our programmatic care intake where an assessment will likely be needed over telephone, virtually, or in-person. After this assessment, a Navigator will connect with you again to provide a handoff to the specific program for next steps.   Please note that it may take up to 30 days for you to begin a program. If an extended wait occurs, please consider services at the Transition Clinic.  What is programmatic care?  It is a highly structured and comprehensive approach designed to address mental health and substance use disorders.   Programmatic care is typically used when individuals have not responded well to less intensive treatments or when they require a higher level of intervention due to the severity of their condition. It can be found in various settings, such as an outpatient location, residential treatment facilities, or inpatient hospitals.  Each program varies in duration and weekly commitments. Ask a Navigator for more program details.           Aftercare Plan      If I am feeling unsafe or I am in a crisis, I will:   Contact my established care providers   Call the National Suicide Prevention Lifeline: 717.280.5398   Go to the nearest emergency room   Call 911   Your [facility] has a mental health crisis team you can call 24/7: [facility] Adult, [phone]    Warning signs that I or other people might notice when a crisis is developing for me: crying, feeling bad about self    Things I am able to do on my own to cope or help me feel better:   -Practice square breathing when I begin to feel anxious - in breath through the nose for the count   of 4 and the first line on the square. Out breath through the mouth for the count of 4 for the second line   of the square. Repeat to complete the square. Repeat the square as many times as needed.    Things that I am able to do with others to cope or help me feel better:  "-Use community resources, including hotline numbers, [facility] crisis and support meetings    Things I can use or do for distraction: -Distraction skills of: going for walks, watching TV, spending time outside, calling a friend or family member  -Download a meditation emmanuel and spend 15-20 minutes per day mediating/relaxing. Some apps to   download include: Calm, Headspace and Insight Timer. All 3 of these apps have free version    Changes I can make to support my mental health and wellness:   -Attend scheduled mental health therapy and psychiatric appointments and follow all recommendations  -Maintain a daily schedule/routine  -Practice deep breathing skills  -Abstain from all mood altering chemicals not currently prescribed to me     People in my life that I can ask for help: [employer] [place] [employer] Mental Illness (ESTELITA)  141.534.5551 or 1.888.ESTELITA.HELPS    Other things that are important when I m in crisis: -Commit to 30 minutes of self care daily - this can be as simple as taking a shower, going for a walk, cooking a meal, read, writing, etc        Crisis Lines  Crisis Text Line  Text 748579  You will be connected with a trained live crisis counselor to provide support.    Por espanol, texto  LYNETTE a 670721 o texto a 442-AYUDAME en WhatsApp    National Hope Line  1.800.SUICIDE [3507607]      Community Resources  Fast Tracker  Linking people to mental health and substance use disorder resources  fasttrackermn.org     [place] Mental Health Warm Line  Peer to peer support  Monday thru Saturday, 12 pm to 10 pm  495.870.8659 or 0.490.677.6087  Text \"Support\" to 70350    [employer] [place] on Mental Illness (ESTELITA)  455.035.6358 or 1.888.ESTELITA.HELPS        Mental Health Apps  My3  https://my3app.org/    VirtualHopeBox  https://[employer].org/apps/virtual-hope-box/      Additional Information  Today you were seen by a licensed mental health professional through Triage and Transition services, Behavioral Healthcare " Providers (PINO)  for a crisis assessment in the Emergency Department at [facility].  It is recommended that you follow up with your established providers (psychiatrist, mental health therapist, and/or primary care doctor - as relevant) as soon as possible. Coordinators from [facility] will be calling you in the next 24-48 hours to ensure that you have the resources you need.  You can also contact [facility] coordinators directly at [phone]. You may have been scheduled for or offered an appointment with a mental health provider. [facility] maintains an extensive network of licensed behavioral health providers to connect patients with the services they need.  We do not charge providers a fee to participate in our referral network.  We match patients with providers based on a patient's specific needs, insurance coverage, and location.  Our first effort will be to refer you to a provider within your care system, and will utilize providers outside your care system as needed.

## 2025-01-25 ENCOUNTER — TELEPHONE (OUTPATIENT)
Dept: BEHAVIORAL HEALTH | Facility: CLINIC | Age: 40
End: 2025-01-25
Payer: COMMERCIAL

## 2025-01-25 NOTE — TELEPHONE ENCOUNTER
Writer left vm for pt regarding follow up care appointments. [facility] numbr provided. No further follow up necessary.